# Patient Record
Sex: MALE | Race: BLACK OR AFRICAN AMERICAN
[De-identification: names, ages, dates, MRNs, and addresses within clinical notes are randomized per-mention and may not be internally consistent; named-entity substitution may affect disease eponyms.]

---

## 2017-03-14 ENCOUNTER — HOSPITAL ENCOUNTER (EMERGENCY)
Dept: HOSPITAL 47 - EC | Age: 22
Discharge: HOME | End: 2017-03-14
Payer: COMMERCIAL

## 2017-03-14 VITALS
SYSTOLIC BLOOD PRESSURE: 150 MMHG | DIASTOLIC BLOOD PRESSURE: 71 MMHG | RESPIRATION RATE: 18 BRPM | TEMPERATURE: 98.3 F | HEART RATE: 78 BPM

## 2017-03-14 DIAGNOSIS — M54.2: ICD-10-CM

## 2017-03-14 DIAGNOSIS — S09.90XA: Primary | ICD-10-CM
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
## 2017-03-14 PROCEDURE — 72100 X-RAY EXAM L-S SPINE 2/3 VWS: CPT

## 2017-03-14 PROCEDURE — 72072 X-RAY EXAM THORAC SPINE 3VWS: CPT

## 2017-03-14 PROCEDURE — 99284 EMERGENCY DEPT VISIT MOD MDM: CPT

## 2017-03-14 PROCEDURE — 72125 CT NECK SPINE W/O DYE: CPT

## 2017-03-14 PROCEDURE — 70450 CT HEAD/BRAIN W/O DYE: CPT
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
## 2021-08-19 ENCOUNTER — HOSPITAL ENCOUNTER (EMERGENCY)
Dept: HOSPITAL 47 - EC | Age: 26
Discharge: HOME | End: 2021-08-19
Payer: SELF-PAY

## 2021-08-19 VITALS — SYSTOLIC BLOOD PRESSURE: 121 MMHG | TEMPERATURE: 98.4 F | HEART RATE: 74 BPM | DIASTOLIC BLOOD PRESSURE: 77 MMHG

## 2021-08-19 VITALS — RESPIRATION RATE: 18 BRPM

## 2021-08-19 DIAGNOSIS — F17.200: ICD-10-CM

## 2021-08-19 DIAGNOSIS — R51.9: ICD-10-CM

## 2021-08-19 DIAGNOSIS — U07.1: Primary | ICD-10-CM

## 2021-08-19 DIAGNOSIS — J45.20: ICD-10-CM

## 2021-08-19 LAB
ALBUMIN SERPL-MCNC: 4.3 G/DL (ref 3.5–5)
ALP SERPL-CCNC: 70 U/L (ref 38–126)
ALT SERPL-CCNC: 34 U/L (ref 4–49)
ANION GAP SERPL CALC-SCNC: 9 MMOL/L
AST SERPL-CCNC: 38 U/L (ref 17–59)
BASOPHILS # BLD AUTO: 0.1 K/UL (ref 0–0.2)
BASOPHILS NFR BLD AUTO: 1 %
BUN SERPL-SCNC: 15 MG/DL (ref 9–20)
CALCIUM SPEC-MCNC: 9.5 MG/DL (ref 8.4–10.2)
CHLORIDE SERPL-SCNC: 107 MMOL/L (ref 98–107)
CO2 SERPL-SCNC: 23 MMOL/L (ref 22–30)
EOSINOPHIL # BLD AUTO: 0.2 K/UL (ref 0–0.7)
EOSINOPHIL NFR BLD AUTO: 2 %
ERYTHROCYTE [DISTWIDTH] IN BLOOD BY AUTOMATED COUNT: 4.36 M/UL (ref 4.3–5.9)
ERYTHROCYTE [DISTWIDTH] IN BLOOD: 12.9 % (ref 11.5–15.5)
GLUCOSE SERPL-MCNC: 95 MG/DL (ref 74–99)
HCT VFR BLD AUTO: 44.4 % (ref 39–53)
HGB BLD-MCNC: 14.5 GM/DL (ref 13–17.5)
LYMPHOCYTES # SPEC AUTO: 0.6 K/UL (ref 1–4.8)
LYMPHOCYTES NFR SPEC AUTO: 8 %
MCH RBC QN AUTO: 33.2 PG (ref 25–35)
MCHC RBC AUTO-ENTMCNC: 32.6 G/DL (ref 31–37)
MCV RBC AUTO: 101.8 FL (ref 80–100)
MONOCYTES # BLD AUTO: 0.6 K/UL (ref 0–1)
MONOCYTES NFR BLD AUTO: 8 %
NEUTROPHILS # BLD AUTO: 5.8 K/UL (ref 1.3–7.7)
NEUTROPHILS NFR BLD AUTO: 79 %
PLATELET # BLD AUTO: 199 K/UL (ref 150–450)
POTASSIUM SERPL-SCNC: 4.3 MMOL/L (ref 3.5–5.1)
PROT SERPL-MCNC: 7 G/DL (ref 6.3–8.2)
SODIUM SERPL-SCNC: 139 MMOL/L (ref 137–145)
WBC # BLD AUTO: 7.4 K/UL (ref 3.8–10.6)

## 2021-08-19 PROCEDURE — 85025 COMPLETE CBC W/AUTO DIFF WBC: CPT

## 2021-08-19 PROCEDURE — 71046 X-RAY EXAM CHEST 2 VIEWS: CPT

## 2021-08-19 PROCEDURE — 96375 TX/PRO/DX INJ NEW DRUG ADDON: CPT

## 2021-08-19 PROCEDURE — 36415 COLL VENOUS BLD VENIPUNCTURE: CPT

## 2021-08-19 PROCEDURE — 96365 THER/PROPH/DIAG IV INF INIT: CPT

## 2021-08-19 PROCEDURE — 87635 SARS-COV-2 COVID-19 AMP PRB: CPT

## 2021-08-19 PROCEDURE — 80053 COMPREHEN METABOLIC PANEL: CPT

## 2021-08-19 PROCEDURE — 96361 HYDRATE IV INFUSION ADD-ON: CPT

## 2021-08-19 PROCEDURE — 99284 EMERGENCY DEPT VISIT MOD MDM: CPT

## 2021-08-19 NOTE — XR
EXAMINATION TYPE: XR chest 2V

 

DATE OF EXAM: 8/19/2021

 

COMPARISON: None

 

HISTORY: 25-year-old male cough and pain

 

TECHNIQUE:  PA and lateral views

 

FINDINGS:  

The cardiomediastinal silhouette, aorta, and pulmonary vasculature are within normal limits. Lungs an
d pleural spaces are clear.

 

 

IMPRESSION:  

No acute cardiopulmonary process.

## 2021-08-19 NOTE — ED
General Adult HPI





- General


Chief complaint: Headache


Stated complaint: Headache, Chills


Time Seen by Provider: 08/19/21 07:10


Source: patient


Mode of arrival: ambulatory





- History of Present Illness


Initial comments: 


 patient is a 25-year-old male with past medical history of mild intermittent 

asthma who presents emergency Department with reported body aches and headache. 

Patient had symptoms that started this morning.  States he is a  

and does have exposure to a lot of people.  He is concerned for Covid and is not

vaccinated. he denies any neck stiffness , chest pain or shortness of breath.  

No nausea or vomiting. denies any diarrhea. no known sick contacts. no other 

alleviating, precipitator modifying factors





- Related Data


                                Home Medications











 Medication  Instructions  Recorded  Confirmed


 


No Known Home Medications  03/14/17 03/14/17











                                    Allergies











Allergy/AdvReac Type Severity Reaction Status Date / Time


 


No Known Allergies Allergy   Verified 08/19/21 07:11














Review of Systems


ROS Statement: 


Those systems with pertinent positive or pertinent negative responses have been 

documented in the HPI.





ROS Other: All systems not noted in ROS Statement are negative.





Past Medical History


Past Medical History: No Reported History


History of Any Multi-Drug Resistant Organisms: None Reported


Past Surgical History: No Surgical Hx Reported


Past Psychological History: No Psychological Hx Reported


Smoking Status: Current every day smoker


Past Alcohol Use History: None Reported


Past Drug Use History: None Reported





General Exam


General appearance: alert, in no apparent distress


Head exam: Present: atraumatic, normocephalic, normal inspection


Eye exam: Present: normal appearance, PERRL, EOMI.  Absent: scleral icterus, 

conjunctival injection, periorbital swelling


ENT exam: Present: normal exam, mucous membranes moist


Neck exam: Present: normal inspection.  Absent: tenderness, meningismus, 

lymphadenopathy


Respiratory exam: Present: normal lung sounds bilaterally.  Absent: respiratory 

distress, wheezes, rales, rhonchi, stridor


Cardiovascular Exam: Present: regular rate, normal rhythm, normal heart sounds. 

Absent: systolic murmur, diastolic murmur, rubs, gallop, clicks


GI/Abdominal exam: Present: soft, normal bowel sounds.  Absent: distended, 

tenderness, guarding, rebound, rigid


Extremities exam: Present: normal inspection, full ROM, normal capillary refill.

 Absent: tenderness, pedal edema, joint swelling, calf tenderness


Back exam: Present: normal inspection


Neurological exam: Present: alert, oriented X3, CN II-XII intact


Psychiatric exam: Present: normal affect, normal mood


Skin exam: Present: warm, dry, intact, normal color.  Absent: rash





Course


                                   Vital Signs











  08/19/21 08/19/21 08/19/21





  07:09 11:30 12:41


 


Temperature 98.9 F 98.4 F 98.4 F


 


Pulse Rate 88 74 74


 


Respiratory 18 18 18





Rate   


 


Blood Pressure 120/61 121/77 121/77


 


O2 Sat by Pulse 98 99 99





Oximetry   














Medical Decision Making





- Medical Decision Making





Upon arrival the patient was placed into room 14.  A thorough history and 

physical exam is performed.  IV is established the patient is given a migraine 

cocktail.  Laboratory studies were conducted.  Patient was swabbed for Covid.  

Chest x-ray is performed.  Laboratory studies are remarkable for a positive 

Covid swab.  Chest x-ray demonstrates no acute process.  Patient is reevaluated 

and has improvement in his headache.  He is offered Regen infusion at this time.

 Patient does receive infusion without issue.  He will be discharged home and is

to follow-up with his primary care doctor within 2-4 days.  [place] until his

symptoms improve.  Return to the emergency room for any new or worsening 

symptoms.  Patient was discharged home in stable condition





- Lab Data


Result diagrams: 


                                 08/19/21 07:57





                                 08/19/21 07:57


                                   Lab Results











  08/19/21 08/19/21 08/19/21 Range/Units





  07:57 07:57 07:57 


 


WBC  7.4    (3.8-10.6)  k/uL


 


RBC  4.36    (4.30-5.90)  m/uL


 


Hgb  14.5    (13.0-17.5)  gm/dL


 


Hct  44.4    (39.0-53.0)  %


 


MCV  101.8 H    (80.0-100.0)  fL


 


MCH  33.2    (25.0-35.0)  pg


 


MCHC  32.6    (31.0-37.0)  g/dL


 


RDW  12.9    (11.5-15.5)  %


 


Plt Count  199    (150-450)  k/uL


 


MPV  7.2    


 


Neutrophils %  79    %


 


Lymphocytes %  8    %


 


Monocytes %  8    %


 


Eosinophils %  2    %


 


Basophils %  1    %


 


Neutrophils #  5.8    (1.3-7.7)  k/uL


 


Lymphocytes #  0.6 L    (1.0-4.8)  k/uL


 


Monocytes #  0.6    (0-1.0)  k/uL


 


Eosinophils #  0.2    (0-0.7)  k/uL


 


Basophils #  0.1    (0-0.2)  k/uL


 


Macrocytosis  Slight    


 


Sodium   139   (137-145)  mmol/L


 


Potassium   4.3   (3.5-5.1)  mmol/L


 


Chloride   107   ()  mmol/L


 


Carbon Dioxide   23   (22-30)  mmol/L


 


Anion Gap   9   mmol/L


 


BUN   15   (9-20)  mg/dL


 


Creatinine   0.88   (0.66-1.25)  mg/dL


 


Est GFR (CKD-EPI)AfAm   >90   (>60 ml/min/1.73 sqM)  


 


Est GFR (CKD-EPI)NonAf   >90   (>60 ml/min/1.73 sqM)  


 


Glucose   95   (74-99)  mg/dL


 


Calcium   9.5   (8.4-10.2)  mg/dL


 


Total Bilirubin   0.4   (0.2-1.3)  mg/dL


 


AST   38   (17-59)  U/L


 


ALT   34   (4-49)  U/L


 


Alkaline Phosphatase   70   ()  U/L


 


Total Protein   7.0   (6.3-8.2)  g/dL


 


Albumin   4.3   (3.5-5.0)  g/dL


 


Coronavirus (PCR)    Detected A  (Not Detectd)  














Disposition


Clinical Impression: 


 COVID, Headache





Disposition: HOME SELF-CARE


Condition: Stable


Instructions (If sedation given, give patient instructions):  Coronavirus 

Disease 2019 (COVID-19)


Additional Instructions: 


Please follow up with your PCP in 2-4 days. Return to the ED for any new or 

worsening symptoms.  


Is patient prescribed a controlled substance at d/c from ED?: No


Referrals: 


None,Stated [Primary Care Provider] - 1-2 days


Time of Disposition: 08:50

## 2022-11-13 ENCOUNTER — HOSPITAL ENCOUNTER (EMERGENCY)
Dept: HOSPITAL 47 - EC | Age: 27
Discharge: HOME | End: 2022-11-13
Payer: COMMERCIAL

## 2022-11-13 VITALS
HEART RATE: 94 BPM | TEMPERATURE: 97.6 F | DIASTOLIC BLOOD PRESSURE: 59 MMHG | RESPIRATION RATE: 20 BRPM | SYSTOLIC BLOOD PRESSURE: 110 MMHG

## 2022-11-13 DIAGNOSIS — J45.909: ICD-10-CM

## 2022-11-13 DIAGNOSIS — F17.200: ICD-10-CM

## 2022-11-13 DIAGNOSIS — R22.30: Primary | ICD-10-CM

## 2022-11-13 PROCEDURE — 99283 EMERGENCY DEPT VISIT LOW MDM: CPT
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
## 2022-12-20 ENCOUNTER — HOSPITAL ENCOUNTER (EMERGENCY)
Dept: HOSPITAL 47 - EC | Age: 27
Discharge: HOME | End: 2022-12-20
Payer: COMMERCIAL

## 2022-12-20 VITALS — RESPIRATION RATE: 16 BRPM

## 2022-12-20 VITALS — DIASTOLIC BLOOD PRESSURE: 70 MMHG | SYSTOLIC BLOOD PRESSURE: 113 MMHG | HEART RATE: 68 BPM

## 2022-12-20 VITALS — TEMPERATURE: 98.5 F

## 2022-12-20 DIAGNOSIS — F17.200: ICD-10-CM

## 2022-12-20 DIAGNOSIS — J45.909: ICD-10-CM

## 2022-12-20 DIAGNOSIS — R55: Primary | ICD-10-CM

## 2022-12-20 DIAGNOSIS — G43.909: ICD-10-CM

## 2022-12-20 LAB
ALBUMIN SERPL-MCNC: 3.9 G/DL (ref 3.5–5)
ALP SERPL-CCNC: 57 U/L (ref 38–126)
ALT SERPL-CCNC: 28 U/L (ref 4–49)
ANION GAP SERPL CALC-SCNC: 4 MMOL/L
AST SERPL-CCNC: 32 U/L (ref 17–59)
BASOPHILS # BLD AUTO: 0 K/UL (ref 0–0.2)
BASOPHILS NFR BLD AUTO: 1 %
BUN SERPL-SCNC: 12 MG/DL (ref 9–20)
CALCIUM SPEC-MCNC: 8.8 MG/DL (ref 8.4–10.2)
CHLORIDE SERPL-SCNC: 113 MMOL/L (ref 98–107)
CO2 SERPL-SCNC: 23 MMOL/L (ref 22–30)
EOSINOPHIL # BLD AUTO: 0.1 K/UL (ref 0–0.7)
EOSINOPHIL NFR BLD AUTO: 2 %
ERYTHROCYTE [DISTWIDTH] IN BLOOD BY AUTOMATED COUNT: 4.46 M/UL (ref 4.3–5.9)
ERYTHROCYTE [DISTWIDTH] IN BLOOD: 11.9 % (ref 11.5–15.5)
GLUCOSE SERPL-MCNC: 91 MG/DL (ref 74–99)
HCT VFR BLD AUTO: 44.6 % (ref 39–53)
HGB BLD-MCNC: 14.7 GM/DL (ref 13–17.5)
LYMPHOCYTES # SPEC AUTO: 1.8 K/UL (ref 1–4.8)
LYMPHOCYTES NFR SPEC AUTO: 29 %
MAGNESIUM SPEC-SCNC: 1.8 MG/DL (ref 1.6–2.3)
MCH RBC QN AUTO: 33 PG (ref 25–35)
MCHC RBC AUTO-ENTMCNC: 33 G/DL (ref 31–37)
MCV RBC AUTO: 100 FL (ref 80–100)
MONOCYTES # BLD AUTO: 0.4 K/UL (ref 0–1)
MONOCYTES NFR BLD AUTO: 6 %
NEUTROPHILS # BLD AUTO: 3.7 K/UL (ref 1.3–7.7)
NEUTROPHILS NFR BLD AUTO: 60 %
PLATELET # BLD AUTO: 279 K/UL (ref 150–450)
POTASSIUM SERPL-SCNC: 4.2 MMOL/L (ref 3.5–5.1)
PROT SERPL-MCNC: 6.4 G/DL (ref 6.3–8.2)
SODIUM SERPL-SCNC: 140 MMOL/L (ref 137–145)
WBC # BLD AUTO: 6.1 K/UL (ref 3.8–10.6)

## 2022-12-20 PROCEDURE — 99284 EMERGENCY DEPT VISIT MOD MDM: CPT

## 2022-12-20 PROCEDURE — 96374 THER/PROPH/DIAG INJ IV PUSH: CPT

## 2022-12-20 PROCEDURE — 96361 HYDRATE IV INFUSION ADD-ON: CPT

## 2022-12-20 PROCEDURE — 70450 CT HEAD/BRAIN W/O DYE: CPT

## 2022-12-20 PROCEDURE — 96375 TX/PRO/DX INJ NEW DRUG ADDON: CPT

## 2022-12-20 PROCEDURE — 85025 COMPLETE CBC W/AUTO DIFF WBC: CPT

## 2022-12-20 PROCEDURE — 93005 ELECTROCARDIOGRAM TRACING: CPT

## 2022-12-20 PROCEDURE — 36415 COLL VENOUS BLD VENIPUNCTURE: CPT

## 2022-12-20 PROCEDURE — 80053 COMPREHEN METABOLIC PANEL: CPT

## 2022-12-20 PROCEDURE — 83735 ASSAY OF MAGNESIUM: CPT

## 2022-12-20 NOTE — CT
EXAMINATION TYPE: CT brain wo con

 

DATE OF EXAM: 12/20/2022

 

COMPARISON: 3/14/2017

 

HISTORY: Severe headache

 

CT DLP: 1227 mGycm

 

Unenhanced CT of the brain was performed.  

 

The ventricles, basal cisterns and sulci overlying the cerebral convexities demonstrate a normal appe
arance.  

 

There is no evidence for intracranial hemorrhage or sulcal effacement.  

 

No mass effects are seen.  

 

Osseous calvarium is intact.

 

If symptoms persist consider MRI as clinically warranted.  

 

IMPRESSION:

 

1.  No acute intracranial process is seen at this time.

## 2022-12-20 NOTE — ED
General Adult HPI





- General


Chief complaint: Syncope


Stated complaint: Headache, Syncope


Time Seen by Provider: 12/20/22 06:49


Source: patient, RN notes reviewed


Mode of arrival: wheelchair


Limitations: no limitations





- History of Present Illness


Initial comments: 


27-year-old male presents emergency Department with chief complaint of headache,

syncopal episode.  Patient states that he woke up with a headache which is very 

atypical states is very severe in the frontal aspect.  Patient states he also 

passed out this morning which is not unusual for the patient per significant 

other.  Patient states usually happens with movement he states that he donates 

plasma states that he was told his protein level and other blood counts were 

off.  Patient states he does not feel lightheaded or dizzy at this time denies 

fevers chills cough or cold-like symptoms no chest pain or shortness breath no 

focal weakness denies any head trauma with this fall.








- Related Data


                                  Previous Rx's











 Medication  Instructions  Recorded


 


dexAMETHasone [Decadron] 4 mg PO DAILY #3 tab 11/13/22











                                    Allergies











Allergy/AdvReac Type Severity Reaction Status Date / Time


 


No Known Allergies Allergy   Verified 12/20/22 06:48














Review of Systems


ROS Statement: 


Those systems with pertinent positive or pertinent negative responses have been 

documented in the HPI.





ROS Other: All systems not noted in ROS Statement are negative.





Past Medical History


Past Medical History: Asthma


History of Any Multi-Drug Resistant Organisms: None Reported


Past Surgical History: No Surgical Hx Reported


Past Psychological History: No Psychological Hx Reported


Smoking Status: Current every day smoker


Past Alcohol Use History: None Reported


Past Drug Use History: None Reported





General Exam


General appearance: alert, in no apparent distress


Head exam: Present: atraumatic, normocephalic, normal inspection


Eye exam: Present: normal appearance, PERRL, EOMI.  Absent: scleral icterus, 

conjunctival injection, periorbital swelling


ENT exam: Present: normal exam, normal oropharynx, mucous membranes moist


Neck exam: Present: normal inspection, full ROM.  Absent: tenderness, 

meningismus, lymphadenopathy


Respiratory exam: Present: normal lung sounds bilaterally.  Absent: respiratory 

distress, wheezes, rales, rhonchi, stridor


Cardiovascular Exam: Present: regular rate, normal rhythm, normal heart sounds. 

 Absent: systolic murmur, diastolic murmur, rubs, gallop, clicks


Neurological exam: Present: alert, oriented X3, CN II-XII intact.  Absent: motor

 sensory deficit


Skin exam: Present: warm, dry, intact, normal color.  Absent: rash





Course


                                   Vital Signs











  12/20/22 12/20/22 12/20/22





  06:41 07:14 07:19


 


Temperature 98.5 F  


 


Pulse Rate 79  


 


Pulse Rate [   75





Right Sitting]   


 


Pulse Rate [   97





Right Standing]   


 


Pulse Rate [  84 





Right Supine   





Pulse Oximetery   





]   


 


Respiratory 19 16 





Rate   


 


Blood Pressure 128/73  


 


Blood Pressure   127/68





[Right Arm   





Sitting]   


 


Blood Pressure   118/80





[Right Arm   





Standing]   


 


Blood Pressure  120/73 





[Right Arm   





Supine]   


 


O2 Sat by Pulse 99  





Oximetry   














EKG Findings





- EKG Comments:


EKG Findings:: EKG performed at 7:32 sinus rhythm rate of 71  QRS 91 

QT//366





- EKG Results:


EKG: interpreted by SANDRA





Medical Decision Making





- Medical Decision Making


27-year-old male presented for migraine-type headache which is atypical for p

atient CT was obtained given patient's symptoms CT interpreted by me and 

radiology no acute process.  Patient's laboratory unremarkable patient is 

currently asymptomatic denies chest pain or shortness of breath.  Patient 

donates plasma and has is had a vasovagal syncope.  Patient denies increase flu

ids, patient denies any needs to follow-up PCP be cleared dryive








- Lab Data


Result diagrams: 


                                 12/20/22 07:50





                                 12/20/22 07:50


                                   Lab Results











  12/20/22 12/20/22 Range/Units





  07:50 07:50 


 


WBC  6.1   (3.8-10.6)  k/uL


 


RBC  4.46   (4.30-5.90)  m/uL


 


Hgb  14.7   (13.0-17.5)  gm/dL


 


Hct  44.6   (39.0-53.0)  %


 


MCV  100.0   (80.0-100.0)  fL


 


MCH  33.0   (25.0-35.0)  pg


 


MCHC  33.0   (31.0-37.0)  g/dL


 


RDW  11.9   (11.5-15.5)  %


 


Plt Count  279   (150-450)  k/uL


 


MPV  7.2   


 


Neutrophils %  60   %


 


Lymphocytes %  29   %


 


Monocytes %  6   %


 


Eosinophils %  2   %


 


Basophils %  1   %


 


Neutrophils #  3.7   (1.3-7.7)  k/uL


 


Lymphocytes #  1.8   (1.0-4.8)  k/uL


 


Monocytes #  0.4   (0-1.0)  k/uL


 


Eosinophils #  0.1   (0-0.7)  k/uL


 


Basophils #  0.0   (0-0.2)  k/uL


 


Sodium   140  (137-145)  mmol/L


 


Potassium   4.2  (3.5-5.1)  mmol/L


 


Chloride   113 H  ()  mmol/L


 


Carbon Dioxide   23  (22-30)  mmol/L


 


Anion Gap   4  mmol/L


 


BUN   12  (9-20)  mg/dL


 


Creatinine   0.97  (0.66-1.25)  mg/dL


 


Est GFR (CKD-EPI)AfAm   >90  (>60 ml/min/1.73 sqM)  


 


Est GFR (CKD-EPI)NonAf   >90  (>60 ml/min/1.73 sqM)  


 


Glucose   91  (74-99)  mg/dL


 


Calcium   8.8  (8.4-10.2)  mg/dL


 


Magnesium   1.8  (1.6-2.3)  mg/dL


 


Total Bilirubin   0.4  (0.2-1.3)  mg/dL


 


AST   32  (17-59)  U/L


 


ALT   28  (4-49)  U/L


 


Alkaline Phosphatase   57  ()  U/L


 


Total Protein   6.4  (6.3-8.2)  g/dL


 


Albumin   3.9  (3.5-5.0)  g/dL














Disposition


Clinical Impression: 


 Vasovagal syncope, Migraine





Disposition: HOME SELF-CARE


Condition: Stable


Instructions (If sedation given, give patient instructions):  Syncope (ED)


Additional Instructions: 


Please return to the Emergency Department if symptoms worsen or any other 

concerns.


Is patient prescribed a controlled substance at d/c from ED?: No


Referrals: 


None,Stated [Primary Care Provider] - 1-2 days


Time of Disposition: 08:42

## 2024-08-29 ENCOUNTER — HOSPITAL ENCOUNTER (OUTPATIENT)
Dept: HOSPITAL 47 - CATHEP | Age: 29
Discharge: HOME | End: 2024-08-29
Attending: INTERNAL MEDICINE
Payer: SELF-PAY

## 2024-08-29 VITALS
HEART RATE: 59 BPM | RESPIRATION RATE: 16 BRPM | SYSTOLIC BLOOD PRESSURE: 119 MMHG | DIASTOLIC BLOOD PRESSURE: 65 MMHG | TEMPERATURE: 97.4 F

## 2024-08-29 PROCEDURE — 93660 TILT TABLE EVALUATION: CPT

## 2024-08-29 RX ADMIN — NICARDIPINE HYDROCHLORIDE ONE MLS: 2.5 INJECTION INTRAVENOUS at 07:16

## 2024-08-29 NOTE — P.EPPROC
- EP Procedure Note


Electrophysiology Procedure Note: 





Diagnosis


Recurrent syncope





Twelve-lead EKG shows sinus rhythm normal MO early repolarization abnormality, 

normal variation


Normal QT interval, no delta waves no epsilon waves normal ST segments in the 

precordial leads





Tilt table test per protocol


Baseline blood pressure 130/64 mmHg pulse rate in the 60s


Patient was tilted upright in angle of 70 degrees per protocol


No change in heart rate or blood pressure


Patient reported lightheadedness repeatedly without any change in heart rate or 

blood pressure





Impression


Normal twelve-lead EKG


Normal heart rate and blood pressure response to upright tilting


Patient complained of several episodes of lightheadedness WITHOUT any associated

change in heart rate or blood pressure

## 2024-11-04 ENCOUNTER — HOSPITAL ENCOUNTER (EMERGENCY)
Dept: HOSPITAL 47 - EC | Age: 29
Discharge: HOME | End: 2024-11-04
Payer: SELF-PAY

## 2024-11-04 VITALS
DIASTOLIC BLOOD PRESSURE: 72 MMHG | RESPIRATION RATE: 18 BRPM | SYSTOLIC BLOOD PRESSURE: 113 MMHG | HEART RATE: 75 BPM | TEMPERATURE: 98.1 F

## 2024-11-04 DIAGNOSIS — R07.89: Primary | ICD-10-CM

## 2024-11-04 DIAGNOSIS — F17.200: ICD-10-CM

## 2024-11-04 LAB
ALBUMIN SERPL-MCNC: 4.4 G/DL (ref 3.5–5)
ALP SERPL-CCNC: 62 U/L (ref 38–126)
ALT SERPL-CCNC: 32 U/L (ref 4–49)
ANION GAP SERPL CALC-SCNC: 6 MMOL/L
APTT BLD: 25 SEC (ref 22–30)
AST SERPL-CCNC: 34 U/L (ref 17–59)
BASOPHILS # BLD AUTO: 0 K/UL (ref 0–0.2)
BASOPHILS NFR BLD AUTO: 0 %
BUN SERPL-SCNC: 15 MG/DL (ref 9–20)
CALCIUM SPEC-MCNC: 9.2 MG/DL (ref 8.4–10.2)
CHLORIDE SERPL-SCNC: 109 MMOL/L (ref 98–107)
CO2 SERPL-SCNC: 24 MMOL/L (ref 22–30)
EOSINOPHIL # BLD AUTO: 0.1 K/UL (ref 0–0.7)
EOSINOPHIL NFR BLD AUTO: 2 %
ERYTHROCYTE [DISTWIDTH] IN BLOOD BY AUTOMATED COUNT: 4.04 M/UL (ref 4.3–5.9)
ERYTHROCYTE [DISTWIDTH] IN BLOOD: 12.6 % (ref 11.5–15.5)
GLUCOSE SERPL-MCNC: 90 MG/DL (ref 74–99)
HCT VFR BLD AUTO: 40.2 % (ref 39–53)
HGB BLD-MCNC: 13.1 GM/DL (ref 13–17.5)
INR PPP: 1 (ref ?–1.2)
LYMPHOCYTES # SPEC AUTO: 2.5 K/UL (ref 1–4.8)
LYMPHOCYTES NFR SPEC AUTO: 36 %
MAGNESIUM SPEC-SCNC: 1.7 MG/DL (ref 1.6–2.3)
MCH RBC QN AUTO: 32.3 PG (ref 25–35)
MCHC RBC AUTO-ENTMCNC: 32.5 G/DL (ref 31–37)
MCV RBC AUTO: 99.4 FL (ref 80–100)
MONOCYTES # BLD AUTO: 0.4 K/UL (ref 0–1)
MONOCYTES NFR BLD AUTO: 6 %
NEUTROPHILS # BLD AUTO: 3.8 K/UL (ref 1.3–7.7)
NEUTROPHILS NFR BLD AUTO: 54 %
PLATELET # BLD AUTO: 239 K/UL (ref 150–450)
POTASSIUM SERPL-SCNC: 3.7 MMOL/L (ref 3.5–5.1)
PROT SERPL-MCNC: 7.2 G/DL (ref 6.3–8.2)
PT BLD: 10.7 SEC (ref 10–12.5)
SODIUM SERPL-SCNC: 139 MMOL/L (ref 137–145)
WBC # BLD AUTO: 7 K/UL (ref 3.8–10.6)

## 2024-11-04 PROCEDURE — 36415 COLL VENOUS BLD VENIPUNCTURE: CPT

## 2024-11-04 PROCEDURE — 85730 THROMBOPLASTIN TIME PARTIAL: CPT

## 2024-11-04 PROCEDURE — 71046 X-RAY EXAM CHEST 2 VIEWS: CPT

## 2024-11-04 PROCEDURE — 85025 COMPLETE CBC W/AUTO DIFF WBC: CPT

## 2024-11-04 PROCEDURE — 99285 EMERGENCY DEPT VISIT HI MDM: CPT

## 2024-11-04 PROCEDURE — 80053 COMPREHEN METABOLIC PANEL: CPT

## 2024-11-04 PROCEDURE — 84484 ASSAY OF TROPONIN QUANT: CPT

## 2024-11-04 PROCEDURE — 93005 ELECTROCARDIOGRAM TRACING: CPT

## 2024-11-04 PROCEDURE — 83735 ASSAY OF MAGNESIUM: CPT

## 2024-11-04 PROCEDURE — 85610 PROTHROMBIN TIME: CPT

## 2024-11-04 RX ADMIN — ASPIRIN 81 MG CHEWABLE TABLET STA MG: 81 TABLET CHEWABLE at 21:31

## 2024-12-15 ENCOUNTER — HOSPITAL ENCOUNTER (OUTPATIENT)
Dept: HOSPITAL 47 - EC | Age: 29
Setting detail: OBSERVATION
LOS: 1 days | Discharge: HOME | End: 2024-12-16
Attending: HOSPITALIST | Admitting: HOSPITALIST
Payer: COMMERCIAL

## 2024-12-15 DIAGNOSIS — F17.200: ICD-10-CM
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
## 2024-12-15 DIAGNOSIS — S09.90XA: ICD-10-CM

## 2024-12-15 DIAGNOSIS — H53.149: ICD-10-CM

## 2024-12-15 DIAGNOSIS — R55: Primary | ICD-10-CM

## 2024-12-15 DIAGNOSIS — G43.109: ICD-10-CM

## 2024-12-15 LAB
ALBUMIN SERPL-MCNC: 3.9 G/DL (ref 3.5–5)
ALP SERPL-CCNC: 61 U/L (ref 38–126)
ALT SERPL-CCNC: 48 U/L (ref 4–49)
ANION GAP SERPL CALC-SCNC: 5 MMOL/L
APTT BLD: 22.3 SEC (ref 22–30)
AST SERPL-CCNC: 35 U/L (ref 17–59)
BASOPHILS # BLD AUTO: 0 K/UL (ref 0–0.2)
BASOPHILS NFR BLD AUTO: 0 %
BUN SERPL-SCNC: 13 MG/DL (ref 9–20)
CALCIUM SPEC-MCNC: 9 MG/DL (ref 8.4–10.2)
CHLORIDE SERPL-SCNC: 108 MMOL/L (ref 98–107)
CO2 SERPL-SCNC: 25 MMOL/L (ref 22–30)
EOSINOPHIL # BLD AUTO: 0.1 K/UL (ref 0–0.7)
EOSINOPHIL NFR BLD AUTO: 1 %
ERYTHROCYTE [DISTWIDTH] IN BLOOD BY AUTOMATED COUNT: 4.31 M/UL (ref 4.3–5.9)
ERYTHROCYTE [DISTWIDTH] IN BLOOD: 12.4 % (ref 11.5–15.5)
GLUCOSE SERPL-MCNC: 88 MG/DL (ref 74–99)
HCT VFR BLD AUTO: 43.1 % (ref 39–53)
HGB BLD-MCNC: 13.8 GM/DL (ref 13–17.5)
INR PPP: 1 (ref ?–1.2)
LYMPHOCYTES # SPEC AUTO: 2.5 K/UL (ref 1–4.8)
LYMPHOCYTES NFR SPEC AUTO: 27 %
MCH RBC QN AUTO: 31.9 PG (ref 25–35)
MCHC RBC AUTO-ENTMCNC: 32 G/DL (ref 31–37)
MCV RBC AUTO: 99.9 FL (ref 80–100)
MONOCYTES # BLD AUTO: 0.5 K/UL (ref 0–1)
MONOCYTES NFR BLD AUTO: 6 %
NEUTROPHILS # BLD AUTO: 5.8 K/UL (ref 1.3–7.7)
NEUTROPHILS NFR BLD AUTO: 64 %
PLATELET # BLD AUTO: 268 K/UL (ref 150–450)
POTASSIUM SERPL-SCNC: 4.1 MMOL/L (ref 3.5–5.1)
PROT SERPL-MCNC: 6 G/DL (ref 6.3–8.2)
PT BLD: 10.8 SEC (ref 10–12.5)
SODIUM SERPL-SCNC: 138 MMOL/L (ref 137–145)
WBC # BLD AUTO: 9 K/UL (ref 3.8–10.6)

## 2024-12-15 PROCEDURE — 93306 TTE W/DOPPLER COMPLETE: CPT

## 2024-12-15 PROCEDURE — 71046 X-RAY EXAM CHEST 2 VIEWS: CPT

## 2024-12-15 PROCEDURE — 85025 COMPLETE CBC W/AUTO DIFF WBC: CPT

## 2024-12-15 PROCEDURE — 80048 BASIC METABOLIC PNL TOTAL CA: CPT

## 2024-12-15 PROCEDURE — 85730 THROMBOPLASTIN TIME PARTIAL: CPT

## 2024-12-15 PROCEDURE — 36415 COLL VENOUS BLD VENIPUNCTURE: CPT

## 2024-12-15 PROCEDURE — 72125 CT NECK SPINE W/O DYE: CPT

## 2024-12-15 PROCEDURE — 93005 ELECTROCARDIOGRAM TRACING: CPT

## 2024-12-15 PROCEDURE — 80053 COMPREHEN METABOLIC PANEL: CPT

## 2024-12-15 PROCEDURE — 70450 CT HEAD/BRAIN W/O DYE: CPT

## 2024-12-15 PROCEDURE — 96374 THER/PROPH/DIAG INJ IV PUSH: CPT

## 2024-12-15 PROCEDURE — 96361 HYDRATE IV INFUSION ADD-ON: CPT

## 2024-12-15 PROCEDURE — 84484 ASSAY OF TROPONIN QUANT: CPT

## 2024-12-15 PROCEDURE — 99285 EMERGENCY DEPT VISIT HI MDM: CPT

## 2024-12-15 PROCEDURE — 85610 PROTHROMBIN TIME: CPT

## 2024-12-15 RX ADMIN — CEFAZOLIN STA MLS/HR: 330 INJECTION, POWDER, FOR SOLUTION INTRAMUSCULAR; INTRAVENOUS at 17:40

## 2024-12-15 RX ADMIN — CEFAZOLIN SCH MLS/HR: 330 INJECTION, POWDER, FOR SOLUTION INTRAMUSCULAR; INTRAVENOUS at 20:45

## 2024-12-15 NOTE — CT
EXAMINATION TYPE: CT brain marileeine wo con

 

DATE OF EXAM: 12/15/2024 6:26 PM

 

COMPARISON: 12/20/2022 

 

CLINICAL INDICATION: Male, 29 years old with history of syncope, fall, head injury, syncope, fall, he
adache

 

TECHNIQUE: CT of the brain is performed utilizing 3 mm thick sections through the posterior fossa and
 3 mm thick sections through the remaining calvarium.  Study is performed within 24 hours of arrival 
to the hospital.

Contrast used: mL of , (none if empty)

CT DLP: 1467.3 mGycm, Automated exposure control for dose reduction was used.

 

FINDINGS:

No abnormal hyperdensity is present to suggest an acute intracranial hemorrhage.

No mass lesion is evident.

No acute infarcts are evident.

Ventricles and sulci are appropriate for the patient age.  

 

Paranasal sinuses and mastoid air cells within the field-of-view are clear. 

 

IMPRESSIONS:

1. No acute intracranial process. Follow-up MRI can be performed as clinically indicated.

 

==============================================================

CT cervical spine.

 

COMPARISON: None

 

TECHNIQUE: CT of the cervical spine is performed in the axial plane at 2 mm thick sections.  Reconstr
ucted images in the coronal, and sagittal plane are reviewed on the computer. 

 

FINDINGS:

No acute fractures are evident.

There is slight kyphosis which could be related to patient positioning or muscle spasm.

Disc heights are preserved.

Vertebral body heights are preserved.

No spinal canal stenosis is evident.

No neural foraminal stenosis is evident. 

 

IMPRESSION:

1. No acute osseous abnormality cervical spine.

2. Mild kyphosis which could be related to patient positioning or muscle spasm.

 

[facility], Workstation: [facility], 12/15/2024 6:39 PM

## 2024-12-15 NOTE — ED
General Adult HPI





- General


Chief complaint: Fall


Stated complaint: Syncope


Time Seen by Provider: 12/15/24 17:10


Source: patient


Mode of arrival: ambulatory


Limitations: no limitations





- History of Present Illness


Initial comments: 





29-year-old male who presents emergency department after he had a syncopal 

episode at home.  History is provided by the patient's mother who is at bedside.

 States that the patient has had syncopal episodes for the past 2 months.  He is

currently under the care of Dr. Tristan.  He reports to once weekly syncopal 

episodes if not more.  He had a tilt table test and Holter monitoring as well as

a stress test to evaluate the etiology of his symptoms.  Reports to a family 

history of prolonged QT syndrome.  Today the patient had a syncopal episode 

where he fell backwards and hit his head off the ground.  EMS was called and 

transported the patient to the hospital.  He does report to a headache.  He has 

photophobia.  No visual changes.  Denies any neck or back pain.  No numbness, 

tingling or weakness in his extremities.  The patient was sent out to the 

waiting room.  He had 2 additional syncopal episodes.  There was no report of 

any seizure-like activity.  Patient not postictal when he becomes aroused.  He 

denies having any symptoms prior to the episode.  Denies any chest pain or 

difficulty breathing.  No other alleviating, precipitating or modifying factors





- Related Data


                                Home Medications











 Medication  Instructions  Recorded  Confirmed


 


Albuterol Inhaler [Ventolin Hfa 1 - 2 puff INHALATION Q6H PRN 08/28/24 08/29/24





Inhaler]   











                                    Allergies











Allergy/AdvReac Type Severity Reaction Status Date / Time


 


No Known Allergies Allergy   Verified 12/15/24 17:13














Review of Systems


ROS Statement: 


Those systems with pertinent positive or pertinent negative responses have been 

documented in the HPI.





ROS Other: All systems not noted in ROS Statement are negative.





Past Medical History


Past Medical History: Asthma, Syncope


Additional Past Medical History / Comment(s): last summer had a few episodes of 

passing out, has happened several times since then, very brief episodes, family 

hx. of long QT, see Dr Tristan H & P


History of Any Multi-Drug Resistant Organisms: None Reported


Past Surgical History: No Surgical Hx Reported


Additional Past Anesthesia/Blood Transfusion Reaction / Comment(s): no family hx

of anesthesia problems


Past Psychological History: No Psychological Hx Reported


Smoking Status: Current every day smoker


Past Alcohol Use History: Occasional


Past Drug Use History: None Reported





General Exam


Limitations: no limitations


General appearance: alert, in no apparent distress


Head exam: Present: atraumatic, normocephalic, normal inspection


Eye exam: Present: normal appearance, PERRL, EOMI.  Absent: scleral icterus, 

conjunctival injection, periorbital swelling


ENT exam: Present: normal exam, mucous membranes moist


Neck exam: Present: normal inspection.  Absent: tenderness, meningismus, 

lymphadenopathy


Respiratory exam: Present: normal lung sounds bilaterally.  Absent: respiratory 

distress, wheezes, rales, rhonchi, stridor


Cardiovascular Exam: Present: regular rate, normal rhythm, normal heart sounds. 

Absent: systolic murmur, diastolic murmur, rubs, gallop, clicks


GI/Abdominal exam: Present: soft, normal bowel sounds.  Absent: distended, 

tenderness, guarding, rebound, rigid


Extremities exam: Present: normal inspection, full ROM, normal capillary refill.

 Absent: tenderness, pedal edema, joint swelling, calf tenderness


Back exam: Present: normal inspection


Neurological exam: Present: alert, oriented X3, CN II-XII intact


Psychiatric exam: Present: normal affect, normal mood


Skin exam: Present: warm, dry, intact, normal color.  Absent: rash





Course


                                   Vital Signs











  12/15/24 12/15/24





  17:07 19:04


 


Temperature 98.0 F 


 


Pulse Rate 68 74


 


Respiratory 18 18





Rate  


 


Blood Pressure 147/89 126/69


 


O2 Sat by Pulse 100 98





Oximetry  














Medical Decision Making





- Medical Decision Making





Was pt. sent in by a medical professional or institution (, PA, NP, urgent 

care, hospital, or nursing home...) When possible be specific


@  -No


Did you speak to anyone other than the patient for history (EMS, parent, family,

police, friend...)? What history was obtained from this source 


@  -Spoke with EMS and mother for history


Did you review nursing and triage notes (agree or disagree)?  Why? 


@  -I reviewed and agree with nursing and triage notes


Were old charts reviewed (outside hosp., previous admission, EMS record, old 

EKG, old radiological studies, urgent care reports/EKG's, nursing home records)?

Report findings 


@  -I reviewed patient's tilt table test from August 2024 completed by Dr. Olson


Differential Diagnosis (chest pain, altered mental status, abdominal pain women,

abdominal pain men, vaginal bleeding, weakness, fever, dyspnea, syncope, 

headache, dizziness, GI bleed, back pain, seizure, CVA, palpatations, mental 

health, musculoskeletal)? 


@  -Differential Syncope:


Valvular disease, hypertrophic cardiomyopathy, pulmonary embolism, tamponade, 

tachycardia, bradycardia, MI, hypovolemia, hemorrhage, dissection, anemia, 

intracranial hemorrhage, seizure, hypoglycemia, carbon monoxide poisoning, this 

is not meant to be an all-inclusive list.


EKG interpreted by me (3pts min.).


@  -Yes and demonstrates sinus rhythm with a rate of 73.  LA interval 176.  QRS 

90.  QTc of 368.  No acute ST segment elevation or depression.  No signs of 

Brugada or Haim-Parkinson-White.


X-rays interpreted by me (1pt min.).


@  -Yes and demonstrates no acute process


CT interpreted by me (1pt min.).


@  -Yes and demonstrates no acute process


U/S interpreted by me (1pt. min.).


@  -None done


What testing was considered but not performed or refused? (CT, X-rays, U/S, 

labs)? Why?


@  -None


What meds were considered but not given or refused? Why?


@  -None


Did you discuss the management of the patient with other professionals (prof richards i.e. , PA, NP, lab, RT, psych nurse, , , 

teacher, , )? Give summary


@  -Spoke with Alessandra from [facility] for admission


Was smoking cessation discussed for >3mins.?


@  -No


Was critical care preformed (if so, how long)?


@  -No


Were there social determinants of health that impacted care today? How? 

(Homelessness, low income, unemployed, alcoholism, drug addiction, 

transportation, low edu. Level, literacy, decrease access to med. care, long-term, 

rehab)?


@  -No


Was there de-escalation of care discussed even if they declined (Discuss DNR or 

withdrawal of care, Hospice)? DNR status


@  -No


What co-morbidities impacted this encounter? (DM, HTN, Smoking, COPD, CAD, 

Cancer, CVA, ARF, Chemo, Hep., AIDS, mental health diagnosis, sleep apnea, 

morbid obesity)?


@  -syncope


Was patient admitted / discharged? Hospital course, mention meds given and 

route, prescriptions, significant lab abnormalities, going to OR and other 

pertinent info.


@  -Upon arrival patient seen and evaluated in bed 16.  Thorough history and 

physical exam was performed.  Patient is complaining of a headache due to his 

blunt head trauma.  He was given a dose of morphine for pain control and sent 

over for CT of his head.  Laboratory studies were conducted.  Results are 

discussed with the patient.  Due to several syncopal episodes today I did 

recommend admission for cardiology consultation.  Patient was agreeable to this.

 Spoke with Alessandra from [facility] for admission


Undiagnosed new problem with uncertain prognosis?


@  -yes


Drug Therapy requiring intensive monitoring for toxicity (Heparin, Nitro, 

Insulin, Cardizem)?


@  -No


Were any procedures done?


@  -No


Diagnosis/symptom?


@  -Multiple syncopal episodes, blunt head trauma


Acute, or Chronic, or Acute on Chronic?


@  -Acute


Uncomplicated (without systemic symptoms) or Complicated (systemic symptoms)?


@  -Complicated


Side effects of treatment?


@  -No


Exacerbation, Progression, or Severe Exacerbation?


@  -No


Poses a threat to life or bodily function? How? (Chest pain, USA, MI, pneumonia,

PE, COPD, DKA, ARF, appy, cholecystitis, CVA, Diverticulitis, Homicidal, 

Suicidal, threat to staff... and all critical care pts)


@  -No








- Lab Data


Result diagrams: 


                                 12/15/24 17:44





                                 12/15/24 17:44


                                   Lab Results











  12/15/24 12/15/24 12/15/24 Range/Units





  17:44 17:44 17:44 


 


WBC  9.0    (3.8-10.6)  k/uL


 


RBC  4.31    (4.30-5.90)  m/uL


 


Hgb  13.8    (13.0-17.5)  gm/dL


 


Hct  43.1    (39.0-53.0)  %


 


MCV  99.9    (80.0-100.0)  fL


 


MCH  31.9    (25.0-35.0)  pg


 


MCHC  32.0    (31.0-37.0)  g/dL


 


RDW  12.4    (11.5-15.5)  %


 


Plt Count  268    (150-450)  k/uL


 


MPV  7.2    


 


Neutrophils %  64    %


 


Lymphocytes %  27    %


 


Monocytes %  6    %


 


Eosinophils %  1    %


 


Basophils %  0    %


 


Neutrophils #  5.8    (1.3-7.7)  k/uL


 


Lymphocytes #  2.5    (1.0-4.8)  k/uL


 


Monocytes #  0.5    (0-1.0)  k/uL


 


Eosinophils #  0.1    (0-0.7)  k/uL


 


Basophils #  0.0    (0-0.2)  k/uL


 


PT   10.8   (10.0-12.5)  sec


 


INR   1.0   (<1.2)  


 


APTT   22.3   (22.0-30.0)  sec


 


Sodium    138  (137-145)  mmol/L


 


Potassium    4.1  (3.5-5.1)  mmol/L


 


Chloride    108 H  ()  mmol/L


 


Carbon Dioxide    25  (22-30)  mmol/L


 


Anion Gap    5  mmol/L


 


BUN    13  (9-20)  mg/dL


 


Creatinine    1.04  (0.66-1.25)  mg/dL


 


Est GFR (CKD-EPI)AfAm    >90  (>60 ml/min/1.73 sqM)  


 


Est GFR (CKD-EPI)NonAf    >90  (>60 ml/min/1.73 sqM)  


 


Glucose    88  (74-99)  mg/dL


 


Calcium    9.0  (8.4-10.2)  mg/dL


 


Total Bilirubin    0.5  (0.2-1.3)  mg/dL


 


AST    35  (17-59)  U/L


 


ALT    48  (4-49)  U/L


 


Alkaline Phosphatase    61  ()  U/L


 


Troponin I     (0.000-0.034)  ng/mL


 


Total Protein    6.0 L  (6.3-8.2)  g/dL


 


Albumin    3.9  (3.5-5.0)  g/dL














  12/15/24 Range/Units





  17:44 


 


WBC   (3.8-10.6)  k/uL


 


RBC   (4.30-5.90)  m/uL


 


Hgb   (13.0-17.5)  gm/dL


 


Hct   (39.0-53.0)  %


 


MCV   (80.0-100.0)  fL


 


MCH   (25.0-35.0)  pg


 


MCHC   (31.0-37.0)  g/dL


 


RDW   (11.5-15.5)  %


 


Plt Count   (150-450)  k/uL


 


MPV   


 


Neutrophils %   %


 


Lymphocytes %   %


 


Monocytes %   %


 


Eosinophils %   %


 


Basophils %   %


 


Neutrophils #   (1.3-7.7)  k/uL


 


Lymphocytes #   (1.0-4.8)  k/uL


 


Monocytes #   (0-1.0)  k/uL


 


Eosinophils #   (0-0.7)  k/uL


 


Basophils #   (0-0.2)  k/uL


 


PT   (10.0-12.5)  sec


 


INR   (<1.2)  


 


APTT   (22.0-30.0)  sec


 


Sodium   (137-145)  mmol/L


 


Potassium   (3.5-5.1)  mmol/L


 


Chloride   ()  mmol/L


 


Carbon Dioxide   (22-30)  mmol/L


 


Anion Gap   mmol/L


 


BUN   (9-20)  mg/dL


 


Creatinine   (0.66-1.25)  mg/dL


 


Est GFR (CKD-EPI)AfAm   (>60 ml/min/1.73 sqM)  


 


Est GFR (CKD-EPI)NonAf   (>60 ml/min/1.73 sqM)  


 


Glucose   (74-99)  mg/dL


 


Calcium   (8.4-10.2)  mg/dL


 


Total Bilirubin   (0.2-1.3)  mg/dL


 


AST   (17-59)  U/L


 


ALT   (4-49)  U/L


 


Alkaline Phosphatase   ()  U/L


 


Troponin I  <0.012  (0.000-0.034)  ng/mL


 


Total Protein   (6.3-8.2)  g/dL


 


Albumin   (3.5-5.0)  g/dL














Disposition


Clinical Impression: 


 Syncope, Head injury





Disposition: ADMITTED AS IP TO THIS [facility]


Condition: Stable


Is patient prescribed a controlled substance at d/c from ED?: No


Time of Disposition: 20:02


Decision to Admit Reason: Admit from EC


Decision Date: 12/15/24


Decision Time: 20:02

## 2024-12-15 NOTE — XR
EXAMINATION TYPE: XR chest 2V

 

DATE OF EXAM: 12/15/2024 5:51 PM

 

COMPARISON: Multiple prior chest radiograph, most recently dated 11/4/2024.

 

CLINICAL INDICATION: Male, 29 years old with history of syncope; [facility]

 

TECHNIQUE: XR chest 2V Frontal and lateral views of the chest.

 

FINDINGS: 

Lungs/Pleura: There is no evidence of pleural effusion, focal consolidation, or pneumothorax.  

Pulmonary vascularity: Unremarkable.

Heart/mediastinum: Cardiomediastinal silhouette is unremarkable.

Musculoskeletal: No acute osseous pathology.

 

Other findings: None

 

 

IMPRESSION: 

No acute cardiopulmonary disease/process.

 

 

 

[facility], Workstation: [id number], 12/15/2024 5:56 PM

## 2024-12-16 VITALS
DIASTOLIC BLOOD PRESSURE: 55 MMHG | RESPIRATION RATE: 18 BRPM | TEMPERATURE: 98 F | SYSTOLIC BLOOD PRESSURE: 122 MMHG | HEART RATE: 78 BPM

## 2024-12-16 LAB
ANION GAP SERPL CALC-SCNC: 7.6 MMOL/L (ref 4–12)
BASOPHILS # BLD AUTO: 0.02 X 10*3/UL (ref 0–0.1)
BASOPHILS NFR BLD AUTO: 0.4 %
BUN SERPL-SCNC: 8.7 MG/DL (ref 9–27)
BUN/CREAT SERPL: 8.7 RATIO (ref 12–20)
CALCIUM SPEC-MCNC: 8.4 MG/DL (ref 8.7–10.3)
CHLORIDE SERPL-SCNC: 112 MMOL/L (ref 96–109)
CO2 SERPL-SCNC: 21.4 MMOL/L (ref 21.6–31.8)
EOSINOPHIL # BLD AUTO: 0.11 X 10*3/UL (ref 0.04–0.35)
EOSINOPHIL NFR BLD AUTO: 2 %
ERYTHROCYTE [DISTWIDTH] IN BLOOD BY AUTOMATED COUNT: 4.05 X 10*6/UL (ref 4.4–5.6)
ERYTHROCYTE [DISTWIDTH] IN BLOOD: 12.4 % (ref 11.5–14.5)
GLUCOSE SERPL-MCNC: 88 MG/DL (ref 70–110)
HCT VFR BLD AUTO: 41.3 % (ref 39.6–50)
HGB BLD-MCNC: 12.7 G/DL (ref 13–17)
IMM GRANULOCYTES BLD QL AUTO: 0.2 %
LYMPHOCYTES # SPEC AUTO: 2.08 X 10*3/UL (ref 0.9–5)
LYMPHOCYTES NFR SPEC AUTO: 38.4 %
MCH RBC QN AUTO: 31.4 PG (ref 27–32)
MCHC RBC AUTO-ENTMCNC: 30.8 G/DL (ref 32–37)
MCV RBC AUTO: 102 FL (ref 80–97)
MONOCYTES # BLD AUTO: 0.44 X 10*3/UL (ref 0.2–1)
MONOCYTES NFR BLD AUTO: 8.1 %
NEUTROPHILS # BLD AUTO: 2.75 X 10*3/UL (ref 1.8–7.7)
NEUTROPHILS NFR BLD AUTO: 50.9 %
NRBC BLD AUTO-RTO: 0 X 10*3/UL (ref 0–0.01)
PLATELET # BLD AUTO: 233 X 10*3/UL (ref 140–440)
POTASSIUM SERPL-SCNC: 4.3 MMOL/L (ref 3.5–5.5)
SODIUM SERPL-SCNC: 141 MMOL/L (ref 135–145)
WBC # BLD AUTO: 5.41 X 10*3/UL (ref 4.5–10)

## 2024-12-16 RX ADMIN — MORPHINE SULFATE STA: 4 INJECTION, SOLUTION INTRAMUSCULAR; INTRAVENOUS at 04:37

## 2024-12-16 RX ADMIN — MORPHINE SULFATE STA MG: 4 INJECTION, SOLUTION INTRAMUSCULAR; INTRAVENOUS at 04:34

## 2024-12-16 NOTE — CA
Transthoracic Echo Report 

 Name: Mila Mckeon 

 MRN:    [id number] 

 Age:    29     Gender:     M 

 

 :    1995 

 Exam Date:     2024 08:38 

 Exam Location: [facility] 

 Ht (in):     69     Wt (lb):     207 

 Ordering Physician:        Princess Duarte DO 

 Attending/Referring Phys:         [id number], Felicia 

 Technician         Nathaly Nelson, SOLEDAD 

 Procedure CPT: 

 Indications:       Syncope 

 

 Cardiac Hx: 

 Technical Quality:      Good 

 Contrast 1:                                Total Dose (mL): 

 Contrast 2:                                Total Dose (mL): 

 

 MEASUREMENTS  (Male / Female) Normal Values 

 2D ECHO 

 LV Diastolic Diameter PLAX        4.4 cm                4.2 - 5.9 / 3.9 - 5.3 cm 

 LV Systolic Diameter PLAX         3.2 cm                 

 IVS Diastolic Thickness           1.3 cm                0.6 - 1.0 / 0.6 - 0.9 cm 

 LVPW Diastolic Thickness          1.2 cm                0.6 - 1.0 / 0.6 - 0.9 cm 

 LV Relative Wall Thickness        0.6                    

 RV Internal Dim ED PLAX           3.3 cm                 

 LA Systolic Diameter LX           3.1 cm                3.0 - 4.0 / 2.7 - 3.8 cm 

 LA Volume                         58.5 cm???              18 - 58 / 22 - 52 cm??? 

 LA Volume Index                   27.0 cm???/m???           16 - 28 cm???/m??? 

 

 M-MODE 

 Aortic Root Diameter MM           3.5 cm                 

 AV Cusp Separation MM             3.0 cm                 

 

 DOPPLER 

 AV Peak Velocity                  101.6 cm/s             

 AV Peak Gradient                  4.1 mmHg               

 MV Area PHT                       3.4 cm???                

 Mitral E Point Velocity           86.3 cm/s              

 Mitral A Point Velocity           52.9 cm/s              

 Mitral E to A Ratio               1.6                    

 MV Deceleration Time              222.2 ms               

 

 

 FINDINGS 

 Left Ventricle 

 Left ventricular ejection fraction is estimated at 50-55 %. Left ventricular  

 cavity size normal.  Mildly increased septal wall thickness. No obvious  

 regional wall motion abnormalities. 

 

 Right Ventricle 

 Normal right ventricular size and function. Unable to estimate the right  

 ventricular systolic pressure. 

 

 Right Atrium 

 Normal right atrial size. No spontaneous contrast in the right atrium. 

 

 Left Atrium 

 Normal left atrial size. No left atrial thrombus or mass present. 

 

 Mitral Valve 

 Structurally normal mitral valve. No mitral stenosis, regurgitation or  

 prolapse. 

 

 Aortic Valve 

 Trileaflet aortic valve. No aortic valve stenosis or regurgitation. 

 

 Tricuspid Valve 

 Structurally normal tricuspid valve. No tricuspid stenosis, regurgitation or  

 prolapse. 

 

 Pulmonic Valve 

 Structurally normal pulmonic valve. Mild pulmonic regurgitation. 

 

 Pericardium 

 No pericardial or pleural effusion. 

 

 Aorta 

 Normal size aortic root and proximal ascending aorta. 

 

 CONCLUSIONS 

 Normal LV function 

 Previewed by:  

 Dr. Yefri Ruiz MD 

 (Electronically Signed) 

 Final Date:      2024 10:15

## 2024-12-16 NOTE — P.DS
Providers


Date of admission: 


12/15/24 20:04





Attending physician: 


Minh Sweeney





Consults: 





                                        





12/15/24 20:02


Consult Physician Urgent 


   Consulting Provider: [facility]


   Consult Reason/Comments: multiple syncopal episodes


   Do you want consulting provider notified?: Yes











Primary care physician: 


Stated None





Hospital Course: 





Patient is a pleasant 29-year-old pleasant male came in after syncopal episode 

followed by possible seizure-like activity with tonic-clonic activity and upper 

part of the body.  Patient denied any fever chills nausea vomiting.  Patient was

having migraine since day before and had few episodes of syncope.  Patient had 

syncopized in the past as well.  Patient occasionally has aura with migraine.  

Patient does not have any anion gap or lactic acidosis.  EKG showed sinus rhythm

chest x-ray did not show any significant abnormality only abnormalities his MCV 

is 101.  Echocardiogram was done patient showed any wall abnormalities normal 

ejection fraction.








REVIEW OF SYSTEMS: 








All other systems are negative except those mentioned in the HPI





PHYSICAL EXAMINATION: 





GENERAL: The patient is alert and oriented x3, not in any acute distress. Well 

developed, well nourished. 


HEENT: Pupils are round and equally reacting to light. EOMI. No scleral icterus.

No conjunctival pallor. Normocephalic, atraumatic. No pharyngeal erythema. No 

thyromegaly. 


CARDIOVASCULAR: S1 and S2 present. No murmurs, rubs, or gallops. 


PULMONARY: Chest is clear to auscultation, no wheezing or crackles. 


ABDOMEN: Soft, nontender, nondistended, normoactive bowel sounds. No palpable 

organomegaly. 


MUSCULOSKELETAL: No joint swelling or deformity.


EXTREMITIES: No cyanosis, clubbing, or pedal edema. 


NEUROLOGICAL: Gross neurological examination did not reveal any focal deficits. 


SKIN: No rashes. 





Assessment and plan


-Syncope believed to be secondary to migraine.  Patient may have syncope induced

with seizure his migraine is better.  Patient did take Advil which did not help


-History of migraine with aura patient will be given a prescription for 

Compazine and asked to take Benadryl as needed if he takes more than 2 pills of 

Compazine for migraine headache.  I offered the patient to see a neurologist 

here as an outpatient patient wishes to follow-up with her neurologist as an 

outpatient.


-Elevated MCV without any anemia patient was referred to PCP and recommend doing

B12 and RBC folate as an outpatient.


Patient will be discharged today


Patient Condition at Discharge: Stable





Plan - Discharge Summary


New Discharge Prescriptions: 


New


   Prochlorperazine [Compazine] 10 mg PO Q6H PRN #30 tab


     PRN Reason: Migraine Headache


Discharge Medication List





Prochlorperazine [Compazine] 10 mg PO Q6H PRN #30 tab 12/16/24 [Rx]








Follow up Appointment(s)/Referral(s): 


Ismael Laughlin MD [REFERRING] - 1 Week


Bradly Tafoya DO [STAFF PHYSICIAN] - 1 Week


Discharge Disposition: HOME SELF-CARE

## 2024-12-16 NOTE — P.CRDCN
History of Present Illness


History of present illness: 





HISTORY OF PRESENT ILLNESS:  





This is a 29-year-old male with a past medical history significant for syncope. 

Patient follows in the office with Dr. Tristan. [facility] Patient examined at the bedside in the 

emergency room.  Patient states he had a severe migraine yesterday.  He states 

he had a syncopal episode yesterday as well.  The patient has a history of 

recurrent syncope.  He is underwent extensive evaluation on an outpatient basis.

 Apparently the patient also had 2 syncopal episodes while sitting in the 

waiting room however, details of this are unknown.  The patient currently denies

any chest pain or pressure.  Denies shortness of breath.  Denies dizziness or 

lightheadedness.  Vital signs are stable.





DIAGNOSTICS:


- EKG reveals sinus mechanism with no signs of acute ischemia


- Chest xray negative for acute process


- Laboratory data: WBC 5.41.  Hemoglobin 12.7.  Platelet count 233.  Sodium 140.

 Potassium 4.3.  BUN 8.7.  Creatinine 1.0.  Troponin negative x 1.


- Current home cardiac medications include none


- Echocardiogram completed revealing ejection fraction 50 to 55%, no obvious 

regional wall motion abnormalities, and no significant valvular dysfunction





REVIEW OF SYSTEMS: 


At the time of my exam:


CONSTITUTIONAL: Denies fever or chills.


HEENT: Denies blurred vision, vision changes, or eye pain. Denies hemoptysis 


CARDIOVASCULAR: Denies chest pain. Denies orthopnea. Denies PND. Denies 

palpitations


RESPIRATORY: Denies shortness of breath. 


GASTROINTESTINAL: Denies abdominal pain. Denies nausea or vomiting. 


HEMATOLOGIC: Denies bleeding disorders.


GENITOURINARY:  Denies any blood in urine.


SKIN: Denies pruitis. Denies rash.





PHYSICAL EXAM: 


VITAL SIGNS: Reviewed.


GENERAL: Well-developed in no acute distress. 


HEENT: Head is normocephalic. Pupils are equal, round. Sclerae anicteric. Mucous

membranes of the mouth are moist. Neck supple. No JVD or thyromegaly


LUNGS: Respirations even and unlabored. Lungs essentially clear to auscultation 

bilaterally.


HEART: Regular rate and rhythm.  S1 and S2 heard.


ABDOMEN: Soft. Nondistended. Nontender.


EXTREMITIES: Normal range of motion.  No clubbing or cyanosis.  Peripheral 

pulses intact.  No lower extremity edema


NEUROLOGIC: Awake and alert. Oriented x 3. 





ASSESSMENT: 


Migraine


Syncope, possibly secondary to migraine


History of recurrent syncope





PLAN: 


2D echo obtained and reviewed


Patient underwent tilt table testing in August 2024 which was unremarkable


Patient also had heart monitor in the past due to syncope with no arrhythmias 

noted


Patient underwent stress echocardiogram in July 2024 which was negative for 

ischemia or arrhythmias


If patient remains stable, he may be discharged home today and follow-up in the 

office with Dr. Tristan








Nurse practitioner note has been reviewed by physician. Signing provider agrees 

with the documented findings, assessment, and plan of care documented by NP as a

scribe.








Past Medical History


Past Medical History: Asthma, Syncope


Additional Past Medical History / Comment(s): last summer had a few episodes of 

passing out, has happened several times since then, very brief episodes, family 

hx. of long QT, see Dr Tristan H & P


History of Any Multi-Drug Resistant Organisms: None Reported


Past Surgical History: No Surgical Hx Reported


Additional Past Anesthesia/Blood Transfusion Reaction / Comment(s): no family hx

of anesthesia problems


Past Psychological History: No Psychological Hx Reported


Smoking Status: Current every day smoker


Past Alcohol Use History: Occasional


Past Drug Use History: None Reported





Medications and Allergies


                                Home Medications











 Medication  Instructions  Recorded  Confirmed  Type


 


No Known Home Medications  12/16/24 12/16/24 History








                                    Allergies











Allergy/AdvReac Type Severity Reaction Status Date / Time


 


No Known Allergies Allergy   Verified 12/16/24 08:07














Physical Exam


Vitals: 


                                   Vital Signs











  Temp Pulse Resp BP Pulse Ox


 


 12/16/24 07:32   62  16  104/84  97


 


 12/16/24 02:00   57 L  17  114/62  96


 


 12/15/24 19:04   74  18  126/69  98


 


 12/15/24 17:07  98.0 F  68  18  147/89  100








                                Intake and Output











 12/15/24 12/16/24 12/16/24





 22:59 06:59 14:59


 


Other:   


 


  Weight 93.894 kg  














Results





                                 12/16/24 05:54





                                 12/16/24 05:54


                                 Cardiac Enzymes











  12/15/24 12/15/24 Range/Units





  17:44 17:44 


 


AST  35   (17-59)  U/L


 


Troponin I   <0.012  (0.000-0.034)  ng/mL








                                   Coagulation











  12/15/24 Range/Units





  17:44 


 


PT  10.8  (10.0-12.5)  sec


 


APTT  22.3  (22.0-30.0)  sec








                                       CBC











  12/15/24 12/16/24 Range/Units





  17:44 05:54 


 


WBC  9.0  5.41  (3.8-10.6)  k/uL


 


RBC  4.31  4.05 L  (4.30-5.90)  m/uL


 


Hgb  13.8  12.7 L  (13.0-17.5)  gm/dL


 


Hct  43.1  41.3  (39.0-53.0)  %


 


Plt Count  268  233  (150-450)  k/uL








                          Comprehensive Metabolic Panel











  12/15/24 12/16/24 Range/Units





  17:44 05:54 


 


Sodium  138  141  (137-145)  mmol/L


 


Potassium  4.1  4.3  (3.5-5.1)  mmol/L


 


Chloride  108 H  112 H  ()  mmol/L


 


Carbon Dioxide  25  21.4 L  (22-30)  mmol/L


 


BUN  13  8.7 L  (9-20)  mg/dL


 


Creatinine  1.04  1.0  (0.66-1.25)  mg/dL


 


Glucose  88  88  (74-99)  mg/dL


 


Calcium  9.0  8.4 L  (8.4-10.2)  mg/dL


 


AST  35   (17-59)  U/L


 


ALT  48   (4-49)  U/L


 


Alkaline Phosphatase  61   ()  U/L


 


Total Protein  6.0 L   (6.3-8.2)  g/dL


 


Albumin  3.9   (3.5-5.0)  g/dL








                               Current Medications











Generic Name Dose Route Start Last Admin





  Trade Name Dmq  PRN Reason Stop Dose Admin


 


Acetaminophen  650 mg  12/15/24 20:02 





  Acetaminophen Tab 325 Mg Tab  PO  





  Q6HR PRN  





  Mild Pain or Fever > 100.5  


 


Sodium Chloride  1,000 mls @ 130 mls/hr  12/15/24 20:15  12/16/24 03:15





  Saline 0.9%  IV   Not Given





  .Q7H42M MAGI  


 


Naloxone HCl  0.2 mg  12/15/24 20:02 





  Naloxone 0.4 Mg/Ml 1 Ml Vial  IV  





  Q2M PRN  





  Opioid Reversal  








                                Intake and Output











 12/15/24 12/16/24 12/16/24





 22:59 06:59 14:59


 


Other:   


 


  Weight 93.894 kg  








                                        





                                 12/16/24 05:54 





                                 12/16/24 05:54

## 2024-12-16 NOTE — P.HPIM
History of Present Illness





Patient is a pleasant 29-year-old pleasant male came in after syncopal episode 

followed by possible seizure-like activity with tonic-clonic activity and upper 

part of the body.  Patient denied any fever chills nausea vomiting.  Patient was

having migraine since day before and had few episodes of syncope.  Patient had 

syncopized in the past as well.  Patient occasionally has aura with migraine.  

Patient does not have any anion gap or lactic acidosis.  EKG showed sinus rhythm

chest x-ray did not show any significant abnormality only abnormalities his MCV 

is 101.  Echocardiogram was done patient showed any wall abnormalities normal 

ejection fraction.








REVIEW OF SYSTEMS: 








All other systems are negative except those mentioned in the HPI





PHYSICAL EXAMINATION: 





GENERAL: The patient is alert and oriented x3, not in any acute distress. Well 

developed, well nourished. 


HEENT: Pupils are round and equally reacting to light. EOMI. No scleral icterus.

No conjunctival pallor. Normocephalic, atraumatic. No pharyngeal erythema. No 

thyromegaly. 


CARDIOVASCULAR: S1 and S2 present. No murmurs, rubs, or gallops. 


PULMONARY: Chest is clear to auscultation, no wheezing or crackles. 


ABDOMEN: Soft, nontender, nondistended, normoactive bowel sounds. No palpable 

organomegaly. 


MUSCULOSKELETAL: No joint swelling or deformity.


EXTREMITIES: No cyanosis, clubbing, or pedal edema. 


NEUROLOGICAL: Gross neurological examination did not reveal any focal deficits. 


SKIN: No rashes. 





Assessment and plan


-Syncope believed to be secondary to migraine.  Patient may have syncope induced

with seizure his migraine is better.  Patient did take Advil which did not help


-History of migraine with aura patient will be given a prescription for 

Compazine and asked to take Benadryl as needed if he takes more than 2 pills of 

Compazine for migraine headache.  I offered the patient to see a neurologist 

here as an outpatient patient wishes to follow-up with her neurologist as an 

outpatient.


-Elevated MCV without any anemia patient was referred to PCP and recommend doing

B12 and RBC folate as an outpatient.


Patient will be discharged today





Past Medical History


Past Medical History: Asthma, Syncope


Additional Past Medical History / Comment(s): last summer had a few episodes of 

passing out, has happened several times since then, very brief episodes, family 

hx. of long QT, see Dr Tristan H & P


History of Any Multi-Drug Resistant Organisms: None Reported


Past Surgical History: No Surgical Hx Reported


Additional Past Anesthesia/Blood Transfusion Reaction / Comment(s): no family hx

of anesthesia problems


Past Psychological History: No Psychological Hx Reported


Smoking Status: Current every day smoker


Past Alcohol Use History: Occasional


Past Drug Use History: None Reported





Medications and Allergies


                                Home Medications











 Medication  Instructions  Recorded  Confirmed  Type


 


Prochlorperazine [Compazine] 10 mg PO Q6H PRN #30 tab 12/16/24  Rx








                                    Allergies











Allergy/AdvReac Type Severity Reaction Status Date / Time


 


No Known Allergies Allergy   Verified 12/16/24 08:07














Physical Exam


Vitals: 


                                   Vital Signs











  Temp Pulse Resp BP Pulse Ox


 


 12/16/24 07:32   62  16  104/84  97


 


 12/16/24 02:00   57 L  17  114/62  96


 


 12/15/24 19:04   74  18  126/69  98


 


 12/15/24 17:07  98.0 F  68  18  147/89  100








                                Intake and Output











 12/15/24 12/16/24 12/16/24





 22:59 06:59 14:59


 


Other:   


 


  Weight 93.894 kg  














Results


CBC & Chem 7: 


                                 12/16/24 05:54





                                 12/16/24 05:54


Labs: 


                  Abnormal Lab Results - Last 24 Hours (Table)











  12/15/24 12/16/24 12/16/24 Range/Units





  17:44 05:54 05:54 


 


RBC   4.05 L   (4.40-5.60)  X 10*6/uL


 


Hgb   12.7 L   (13.0-17.0)  g/dL


 


MCV   102.0 H   (80.0-97.0)  FL


 


MCHC   30.8 L   (32.0-37.0)  g/dL


 


Chloride  108 H   112 H  ()  mmol/L


 


Carbon Dioxide    21.4 L  (21.6-31.8)  mmol/L


 


BUN    8.7 L  (9.0-27.0)  mg/dL


 


BUN/Creatinine Ratio    8.70 L  (12.00-20.00)  Ratio


 


Calcium    8.4 L  (8.7-10.3)  mg/dL


 


Total Protein  6.0 L    (6.3-8.2)  g/dL

## 2025-04-06 ENCOUNTER — HOSPITAL ENCOUNTER (OUTPATIENT)
Dept: HOSPITAL 47 - EC | Age: 30
Setting detail: OBSERVATION
LOS: 1 days | Discharge: HOME | End: 2025-04-07
Attending: INTERNAL MEDICINE | Admitting: INTERNAL MEDICINE
Payer: COMMERCIAL

## 2025-04-06 DIAGNOSIS — F17.200: ICD-10-CM

## 2025-04-06 DIAGNOSIS — R56.9: Primary | ICD-10-CM

## 2025-04-06 DIAGNOSIS — G43.909: ICD-10-CM

## 2025-04-06 LAB
ALBUMIN SERPL-MCNC: 3.7 G/DL (ref 3.5–5)
ALP SERPL-CCNC: 42 U/L (ref 38–126)
ALT SERPL-CCNC: 37 U/L (ref 4–49)
ANION GAP SERPL CALC-SCNC: 7 MMOL/L
AST SERPL-CCNC: 30 U/L (ref 17–59)
BASOPHILS NFR BLD AUTO: 0 %
BUN SERPL-SCNC: 10 MG/DL (ref 9–20)
CALCIUM SPEC-MCNC: 8.9 MG/DL (ref 8.4–10.2)
CHLORIDE SERPL-SCNC: 111 MMOL/L (ref 98–107)
CO2 SERPL-SCNC: 23 MMOL/L (ref 22–30)
EOSINOPHIL # BLD AUTO: 0.1 K/UL (ref 0–0.7)
EOSINOPHIL NFR BLD AUTO: 1 %
ERYTHROCYTE [DISTWIDTH] IN BLOOD BY AUTOMATED COUNT: 4.18 M/UL (ref 4.3–5.9)
ERYTHROCYTE [DISTWIDTH] IN BLOOD: 12.8 % (ref 11.5–15.5)
GLUCOSE SERPL-MCNC: 80 MG/DL (ref 74–99)
HCT VFR BLD AUTO: 41.8 % (ref 39–53)
HGB BLD-MCNC: 13.2 GM/DL (ref 13–17.5)
LYMPHOCYTES NFR SPEC AUTO: 35 %
MCH RBC QN AUTO: 31.7 PG (ref 25–35)
MCHC RBC AUTO-ENTMCNC: 31.7 G/DL (ref 31–37)
MCV RBC AUTO: 100.1 FL (ref 80–100)
MONOCYTES # BLD AUTO: 0.3 K/UL (ref 0–1)
MONOCYTES NFR BLD AUTO: 5 %
NEUTROPHILS # BLD AUTO: 3.2 K/UL (ref 1.3–7.7)
NEUTROPHILS NFR BLD AUTO: 57 %
PH UR: 6.5 [PH] (ref 5–8)
PLATELET # BLD AUTO: 242 K/UL (ref 150–450)
POTASSIUM SERPL-SCNC: 4.7 MMOL/L (ref 3.5–5.1)
PROT SERPL-MCNC: 6.1 G/DL (ref 6.3–8.2)
SODIUM SERPL-SCNC: 141 MMOL/L (ref 137–145)
SP GR UR: 1 (ref 1–1.03)
UROBILINOGEN UR QL STRIP: <2 MG/DL (ref ?–2)
WBC # BLD AUTO: 5.6 K/UL (ref 3.8–10.6)

## 2025-04-06 PROCEDURE — 84146 ASSAY OF PROLACTIN: CPT

## 2025-04-06 PROCEDURE — 82552 ASSAY OF CPK IN BLOOD: CPT

## 2025-04-06 PROCEDURE — 96376 TX/PRO/DX INJ SAME DRUG ADON: CPT

## 2025-04-06 PROCEDURE — 95816 EEG AWAKE AND DROWSY: CPT

## 2025-04-06 PROCEDURE — 70450 CT HEAD/BRAIN W/O DYE: CPT

## 2025-04-06 PROCEDURE — 81003 URINALYSIS AUTO W/O SCOPE: CPT

## 2025-04-06 PROCEDURE — 85025 COMPLETE CBC W/AUTO DIFF WBC: CPT

## 2025-04-06 PROCEDURE — 96375 TX/PRO/DX INJ NEW DRUG ADDON: CPT

## 2025-04-06 PROCEDURE — 82550 ASSAY OF CK (CPK): CPT

## 2025-04-06 PROCEDURE — 80053 COMPREHEN METABOLIC PANEL: CPT

## 2025-04-06 PROCEDURE — 93005 ELECTROCARDIOGRAM TRACING: CPT

## 2025-04-06 PROCEDURE — 80306 DRUG TEST PRSMV INSTRMNT: CPT

## 2025-04-06 PROCEDURE — 96365 THER/PROPH/DIAG IV INF INIT: CPT

## 2025-04-06 PROCEDURE — 83735 ASSAY OF MAGNESIUM: CPT

## 2025-04-06 PROCEDURE — 80320 DRUG SCREEN QUANTALCOHOLS: CPT

## 2025-04-06 PROCEDURE — 36415 COLL VENOUS BLD VENIPUNCTURE: CPT

## 2025-04-06 PROCEDURE — 80048 BASIC METABOLIC PNL TOTAL CA: CPT

## 2025-04-06 PROCEDURE — 80076 HEPATIC FUNCTION PANEL: CPT

## 2025-04-06 PROCEDURE — 99285 EMERGENCY DEPT VISIT HI MDM: CPT

## 2025-04-06 RX ADMIN — KETOROLAC TROMETHAMINE STA MG: 15 INJECTION, SOLUTION INTRAMUSCULAR; INTRAVENOUS at 16:22

## 2025-04-06 RX ADMIN — KETOROLAC TROMETHAMINE STA MG: 15 INJECTION, SOLUTION INTRAMUSCULAR; INTRAVENOUS at 15:19

## 2025-04-06 RX ADMIN — LEVETIRACETAM ONE: 100 INJECTION, SOLUTION, CONCENTRATE INTRAVENOUS at 15:32

## 2025-04-06 RX ADMIN — LEVETIRACETAM ONE MG: 100 INJECTION, SOLUTION, CONCENTRATE INTRAVENOUS at 15:18

## 2025-04-06 RX ADMIN — CEFAZOLIN STA MLS/HR: 330 INJECTION, POWDER, FOR SOLUTION INTRAMUSCULAR; INTRAVENOUS at 14:32

## 2025-04-06 RX ADMIN — LEVETIRACETAM SCH MG: 100 INJECTION, SOLUTION, CONCENTRATE INTRAVENOUS at 22:10

## 2025-04-06 RX ADMIN — METOCLOPRAMIDE STA MG: 5 INJECTION, SOLUTION INTRAMUSCULAR; INTRAVENOUS at 15:19

## 2025-04-06 RX ADMIN — DIPHENHYDRAMINE HYDROCHLORIDE STA MG: 50 INJECTION, SOLUTION INTRAMUSCULAR; INTRAVENOUS at 15:19

## 2025-04-06 RX ADMIN — HYDROMORPHONE HYDROCHLORIDE STA MG: 1 INJECTION, SOLUTION INTRAMUSCULAR; INTRAVENOUS; SUBCUTANEOUS at 16:22

## 2025-04-07 VITALS — DIASTOLIC BLOOD PRESSURE: 81 MMHG | HEART RATE: 60 BPM | RESPIRATION RATE: 18 BRPM | SYSTOLIC BLOOD PRESSURE: 132 MMHG

## 2025-04-07 VITALS — TEMPERATURE: 97.4 F

## 2025-04-07 LAB
ALBUMIN SERPL-MCNC: 3.5 G/DL (ref 3.8–4.9)
ALBUMIN/GLOB SERPL: 1.84 RATIO (ref 1.6–3.17)
ALP SERPL-CCNC: 53 U/L (ref 41–126)
ALT SERPL-CCNC: 33 U/L (ref 10–49)
AST SERPL-CCNC: 24 U/L (ref 14–35)
BASOPHILS # BLD AUTO: 0.02 X 10*3/UL (ref 0–0.1)
BASOPHILS NFR BLD AUTO: 0.4 %
BILIRUB INDIRECT SERPL-MCNC: >0.1 MG/DL (ref 0.2–1)
BUN SERPL-SCNC: 8.9 MG/DL (ref 9–27)
CALCIUM SPEC-MCNC: 8.6 MG/DL (ref 8.7–10.3)
CHLORIDE SERPL-SCNC: 110 MMOL/L (ref 96–109)
CO2 SERPL-SCNC: 23.5 MMOL/L (ref 21.6–31.8)
EOSINOPHIL # BLD AUTO: 0.11 X 10*3/UL (ref 0.04–0.35)
ERYTHROCYTE [DISTWIDTH] IN BLOOD BY AUTOMATED COUNT: 3.88 X 10*6/UL (ref 4.4–5.6)
ERYTHROCYTE [DISTWIDTH] IN BLOOD: 12.5 % (ref 11.5–14.5)
GLOBULIN SER CALC-MCNC: 1.9 G/DL (ref 1.6–3.3)
GLUCOSE SERPL-MCNC: 94 MG/DL (ref 70–110)
HCT VFR BLD AUTO: 38.1 % (ref 39.6–50)
HGB BLD-MCNC: 12.5 G/DL (ref 13–17)
LYMPHOCYTES # SPEC AUTO: 2.23 X 10*3/UL (ref 0.9–5)
MAGNESIUM SPEC-SCNC: 1.7 MG/DL (ref 1.5–2.4)
MCH RBC QN AUTO: 32.2 PG (ref 27–32)
MCHC RBC AUTO-ENTMCNC: 32.8 G/DL (ref 32–37)
MCV RBC AUTO: 98.2 FL (ref 80–97)
MONOCYTES # BLD AUTO: 0.42 X 10*3/UL (ref 0.2–1)
MONOCYTES NFR BLD AUTO: 7.5 %
NEUTROPHILS # BLD AUTO: 2.79 X 10*3/UL (ref 1.8–7.7)
NEUTROPHILS NFR BLD AUTO: 49.9 %
NRBC BLD AUTO-RTO: 0 X 10*3/UL (ref 0–0.01)
PLATELET # BLD AUTO: 229 X 10*3/UL (ref 140–440)
POTASSIUM SERPL-SCNC: 4.3 MMOL/L (ref 3.5–5.5)
PROT SERPL-MCNC: 5.4 G/DL (ref 6.2–8.2)
SODIUM SERPL-SCNC: 142 MMOL/L (ref 135–145)
WBC # BLD AUTO: 5.58 X 10*3/UL (ref 4.5–10)

## 2025-04-07 RX ADMIN — LORAZEPAM PRN MG: 2 INJECTION INTRAMUSCULAR; INTRAVENOUS at 11:00

## 2025-04-07 RX ADMIN — KETOROLAC TROMETHAMINE PRN MG: 15 INJECTION, SOLUTION INTRAMUSCULAR; INTRAVENOUS at 08:16
